# Patient Record
Sex: FEMALE | Race: WHITE
[De-identification: names, ages, dates, MRNs, and addresses within clinical notes are randomized per-mention and may not be internally consistent; named-entity substitution may affect disease eponyms.]

---

## 2017-05-09 ENCOUNTER — HOSPITAL ENCOUNTER (EMERGENCY)
Dept: HOSPITAL 53 - M ED | Age: 52
Discharge: HOME | End: 2017-05-09
Payer: OTHER GOVERNMENT

## 2017-05-09 VITALS — SYSTOLIC BLOOD PRESSURE: 156 MMHG | DIASTOLIC BLOOD PRESSURE: 87 MMHG

## 2017-05-09 VITALS — WEIGHT: 206 LBS | BODY MASS INDEX: 33.11 KG/M2 | HEIGHT: 66 IN

## 2017-05-09 DIAGNOSIS — G89.29: ICD-10-CM

## 2017-05-09 DIAGNOSIS — F17.200: ICD-10-CM

## 2017-05-09 DIAGNOSIS — M62.830: Primary | ICD-10-CM

## 2017-05-09 DIAGNOSIS — Z88.8: ICD-10-CM

## 2017-05-09 DIAGNOSIS — F32.9: ICD-10-CM

## 2017-05-09 DIAGNOSIS — Z91.040: ICD-10-CM

## 2017-05-09 DIAGNOSIS — Z98.1: ICD-10-CM

## 2017-05-09 DIAGNOSIS — Z90.49: ICD-10-CM

## 2017-05-09 DIAGNOSIS — F41.9: ICD-10-CM

## 2017-05-09 DIAGNOSIS — M62.81: ICD-10-CM

## 2017-05-09 PROCEDURE — 72148 MRI LUMBAR SPINE W/O DYE: CPT

## 2017-05-09 PROCEDURE — 96372 THER/PROPH/DIAG INJ SC/IM: CPT

## 2017-05-09 PROCEDURE — 99282 EMERGENCY DEPT VISIT SF MDM: CPT

## 2017-05-10 NOTE — REP
MRI lumbar spine without contrast:

 

History:  Low back pain, neurologic deficits right leg, question cauda equina

syndrome.  The patient reports a fall on the right side of the back with spasm

and incontinence.  Comparison MRI study is from December 18, 2012.

 

Technique:  Sagittal and axial T1 and T2-weighted scans are acquired in the usual

fashion with and without fat saturation.  Sequences include spin echo, turbo

spin-echo, and STIR imaging sequences.

 

MR findings:  The patient is status post transpedicular screw fixation on the

left side at the L4, L5 and S1 levels.  Posterior element fusion and laminectomy

at L4, L5 and S1.  Post laminectomy expansion of the thecal sac is seen dorsally

at these levels.  There is no evidence of central canal stenosis.  Conus

medullaris is normal in position and appearance at L1.  Lumbar vertebral body

heights are preserved.  Alignment is normal except for a minimal L4-5 grade 1 3

mm spondylolisthesis.  This was not apparent on December 18, 2012 prior MR study

but does not appear to be acute.  Inversion recovery images show no evidence of

ligament disruption or marrow edema.  No fracture or collapse is seen.  There is

no visible disc herniation at L5-S1, L4-5, or L3-4.  At L2-3, there is minimal

disc bulging diffusely.  No neural foraminal narrowing is seen.  Normal caliber

aorta.  No extraspinal abnormality.

 

Impression:

 

Status post laminectomy L4-S1 and unilateral transpedicular left-sided screw kami

fixation L4-S1.  No evidence of cauda equina compression.  No fracture or

collapse seen.

 

 

Signed by

Geo Perales MD 05/10/2017 08:07 A

## 2017-05-22 ENCOUNTER — HOSPITAL ENCOUNTER (EMERGENCY)
Dept: HOSPITAL 53 - M ED | Age: 52
Discharge: HOME | End: 2017-05-22
Payer: OTHER GOVERNMENT

## 2017-05-22 VITALS
BODY MASS INDEX: 31.66 KG/M2 | HEIGHT: 66 IN | WEIGHT: 197 LBS | DIASTOLIC BLOOD PRESSURE: 88 MMHG | SYSTOLIC BLOOD PRESSURE: 163 MMHG

## 2017-05-22 DIAGNOSIS — Z79.899: ICD-10-CM
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
## 2017-05-22 DIAGNOSIS — G89.29: ICD-10-CM

## 2017-05-22 DIAGNOSIS — S29.012A: Primary | ICD-10-CM
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
## 2017-05-22 DIAGNOSIS — M54.9: ICD-10-CM
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
## 2017-05-22 DIAGNOSIS — Z88.6: ICD-10-CM

## 2017-05-22 DIAGNOSIS — Z91.040: ICD-10-CM

## 2017-07-10 ENCOUNTER — HOSPITAL ENCOUNTER (EMERGENCY)
Dept: HOSPITAL 53 - M ED | Age: 52
Discharge: HOME | End: 2017-07-10
Payer: OTHER GOVERNMENT

## 2017-07-10 VITALS — DIASTOLIC BLOOD PRESSURE: 81 MMHG | SYSTOLIC BLOOD PRESSURE: 156 MMHG

## 2017-07-10 VITALS — HEIGHT: 66 IN | WEIGHT: 187.39 LBS | BODY MASS INDEX: 30.12 KG/M2

## 2017-07-10 DIAGNOSIS — Z79.899: ICD-10-CM
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
## 2017-07-10 DIAGNOSIS — Z88.6: ICD-10-CM

## 2017-07-10 DIAGNOSIS — Z91.040: ICD-10-CM

## 2017-07-10 DIAGNOSIS — K29.70: ICD-10-CM

## 2017-07-10 DIAGNOSIS — Z91.89: ICD-10-CM

## 2017-07-10 DIAGNOSIS — T78.40XA: Primary | ICD-10-CM

## 2017-07-10 DIAGNOSIS — G89.29: ICD-10-CM

## 2017-07-10 DIAGNOSIS — M54.9: ICD-10-CM
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
## 2017-07-10 DIAGNOSIS — F17.200: ICD-10-CM

## 2017-07-10 PROCEDURE — 93041 RHYTHM ECG TRACING: CPT

## 2017-07-10 PROCEDURE — 96374 THER/PROPH/DIAG INJ IV PUSH: CPT

## 2017-07-10 PROCEDURE — 99284 EMERGENCY DEPT VISIT MOD MDM: CPT

## 2017-07-10 PROCEDURE — 94640 AIRWAY INHALATION TREATMENT: CPT

## 2017-07-10 PROCEDURE — 96375 TX/PRO/DX INJ NEW DRUG ADDON: CPT

## 2017-07-10 PROCEDURE — 36415 COLL VENOUS BLD VENIPUNCTURE: CPT

## 2017-07-10 PROCEDURE — 94760 N-INVAS EAR/PLS OXIMETRY 1: CPT

## 2017-07-12 ENCOUNTER — HOSPITAL ENCOUNTER (OUTPATIENT)
Dept: HOSPITAL 53 - M PAIN | Age: 52
End: 2017-07-12
Attending: NURSE PRACTITIONER
Payer: OTHER GOVERNMENT

## 2017-07-12 DIAGNOSIS — Z88.8: ICD-10-CM

## 2017-07-12 DIAGNOSIS — R39.81: ICD-10-CM

## 2017-07-12 DIAGNOSIS — Z79.899: ICD-10-CM

## 2017-07-12 DIAGNOSIS — M54.6: ICD-10-CM

## 2017-07-12 DIAGNOSIS — F17.210: ICD-10-CM

## 2017-07-12 DIAGNOSIS — K21.9: ICD-10-CM

## 2017-07-12 DIAGNOSIS — Z88.6: ICD-10-CM

## 2017-07-12 DIAGNOSIS — Z91.040: ICD-10-CM

## 2017-07-12 DIAGNOSIS — G89.29: Primary | ICD-10-CM

## 2017-07-12 DIAGNOSIS — M53.3: ICD-10-CM

## 2017-08-02 ENCOUNTER — HOSPITAL ENCOUNTER (OUTPATIENT)
Dept: HOSPITAL 53 - M PAIN | Age: 52
End: 2017-08-02
Attending: NURSE PRACTITIONER
Payer: OTHER GOVERNMENT

## 2017-08-02 DIAGNOSIS — M54.6: ICD-10-CM

## 2017-08-02 DIAGNOSIS — M53.3: ICD-10-CM

## 2017-08-02 DIAGNOSIS — K21.9: ICD-10-CM

## 2017-08-02 DIAGNOSIS — F17.210: ICD-10-CM

## 2017-08-02 DIAGNOSIS — Z91.040: ICD-10-CM

## 2017-08-02 DIAGNOSIS — Z88.6: ICD-10-CM

## 2017-08-02 DIAGNOSIS — G89.29: Primary | ICD-10-CM

## 2017-08-02 DIAGNOSIS — Z79.899: ICD-10-CM

## 2017-08-02 DIAGNOSIS — L23.1: ICD-10-CM
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
## 2017-08-14 ENCOUNTER — HOSPITAL ENCOUNTER (EMERGENCY)
Dept: HOSPITAL 53 - M ED | Age: 52
LOS: 1 days | Discharge: HOME | End: 2017-08-15
Payer: COMMERCIAL

## 2017-08-14 VITALS — HEIGHT: 66 IN | WEIGHT: 190.39 LBS | BODY MASS INDEX: 30.6 KG/M2

## 2017-08-14 DIAGNOSIS — G89.29: ICD-10-CM
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
## 2017-08-14 DIAGNOSIS — M54.9: ICD-10-CM

## 2017-08-14 DIAGNOSIS — Z91.040: ICD-10-CM

## 2017-08-14 DIAGNOSIS — Z79.899: ICD-10-CM

## 2017-08-14 DIAGNOSIS — Z91.89: ICD-10-CM

## 2017-08-14 DIAGNOSIS — F32.9: ICD-10-CM

## 2017-08-14 DIAGNOSIS — K21.9: ICD-10-CM
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
## 2017-08-14 DIAGNOSIS — M47.892: ICD-10-CM
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
## 2017-08-14 DIAGNOSIS — Z88.6: ICD-10-CM

## 2017-08-14 DIAGNOSIS — S16.1XXA: Primary | ICD-10-CM

## 2017-08-14 DIAGNOSIS — F17.200: ICD-10-CM

## 2017-08-15 VITALS — SYSTOLIC BLOOD PRESSURE: 168 MMHG | DIASTOLIC BLOOD PRESSURE: 92 MMHG

## 2017-08-15 NOTE — REPUSA
CLINICAL HISTORY: Neck pain.

TECHNIQUE: Multiple axial images were obtained through the cervical spine. Images were also reconstru
cted in coronal and sagittal planes. The study was performed without IV contrast.

COMMENTS:

Unremarkable cervical fusion metallic hardware at C4, C5, C6 and C7 levels.

There is no fracture or spondylolisthesis visualized. The paraspinal soft tissues are unremarkable. T
here are no lytic or blastic lesions.

Straightening of cervical lordosis is seen, suggesting muscular spasm. There is evidence of minimal m
ultilevel disk disease, demonstrated by minimal osteophytosis and endplate sclerosis.

IMPRESSION:

1. No fracture or spondylolisthesis. Unremarkable metallic hardware.

2. Straightening of cervical lordosis is seen, suggesting muscular spasm.

3. Minimal multilevel spondylosis. 

Thank you for your kind referral of this patient.

     Electronically signed by AIDA CURTIS MD on 08/15/2017 01:10:26 AM ET
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
## 2017-08-17 ENCOUNTER — HOSPITAL ENCOUNTER (OUTPATIENT)
Dept: HOSPITAL 53 - M PAIN | Age: 52
End: 2017-08-17
Attending: ANESTHESIOLOGY
Payer: OTHER GOVERNMENT

## 2017-08-17 DIAGNOSIS — F17.210: ICD-10-CM

## 2017-08-17 DIAGNOSIS — M46.1: ICD-10-CM

## 2017-08-17 DIAGNOSIS — G89.29: Primary | ICD-10-CM

## 2017-08-17 DIAGNOSIS — M53.88: ICD-10-CM

## 2017-08-17 DIAGNOSIS — Z88.6: ICD-10-CM

## 2017-08-17 DIAGNOSIS — Z88.8: ICD-10-CM

## 2017-08-17 DIAGNOSIS — K21.9: ICD-10-CM

## 2017-08-17 DIAGNOSIS — Z79.899: ICD-10-CM

## 2017-08-17 DIAGNOSIS — Z91.040: ICD-10-CM

## 2017-08-17 NOTE — REP
PARTIAL SI JOINT SERIES:  Single view.

 

HISTORY:  Injection procedure for pain.

 

27 seconds of fluoroscopy time is reported.

 

FINDINGS:  A single fluoroscopically obtained intraprocedural spot radiograph

documents needle position and contrast injection associated with SI joint

injection procedure.  No laterality markers are seen.

 

 

Signed by

Geo Perales MD 08/17/2017 01:38 P
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
## 2017-09-05 ENCOUNTER — HOSPITAL ENCOUNTER (EMERGENCY)
Dept: HOSPITAL 53 - M ED | Age: 52
Discharge: HOME | End: 2017-09-05
Payer: OTHER GOVERNMENT

## 2017-09-05 VITALS — HEIGHT: 66 IN | BODY MASS INDEX: 29.76 KG/M2 | WEIGHT: 185.19 LBS

## 2017-09-05 VITALS — DIASTOLIC BLOOD PRESSURE: 88 MMHG | SYSTOLIC BLOOD PRESSURE: 152 MMHG

## 2017-09-05 DIAGNOSIS — Z91.018: ICD-10-CM

## 2017-09-05 DIAGNOSIS — K04.7: Primary | ICD-10-CM

## 2017-09-05 DIAGNOSIS — Z79.899: ICD-10-CM

## 2017-09-05 DIAGNOSIS — F17.200: ICD-10-CM

## 2017-09-05 DIAGNOSIS — Z91.040: ICD-10-CM

## 2017-09-05 DIAGNOSIS — Z88.6: ICD-10-CM

## 2017-09-06 ENCOUNTER — HOSPITAL ENCOUNTER (OUTPATIENT)
Dept: HOSPITAL 53 - M PAIN | Age: 52
End: 2017-09-06
Attending: NURSE PRACTITIONER
Payer: COMMERCIAL

## 2017-09-06 DIAGNOSIS — G89.29: ICD-10-CM

## 2017-09-06 DIAGNOSIS — M53.3: Primary | ICD-10-CM

## 2017-09-06 DIAGNOSIS — F17.210: ICD-10-CM

## 2017-09-06 DIAGNOSIS — F41.8: ICD-10-CM

## 2017-09-06 DIAGNOSIS — M79.1: ICD-10-CM

## 2017-09-06 DIAGNOSIS — K21.9: ICD-10-CM

## 2017-09-06 DIAGNOSIS — Z91.09: ICD-10-CM

## 2017-09-06 DIAGNOSIS — Z88.8: ICD-10-CM

## 2017-09-06 DIAGNOSIS — M96.1: ICD-10-CM

## 2017-09-06 DIAGNOSIS — Z91.040: ICD-10-CM

## 2017-09-06 DIAGNOSIS — Z79.899: ICD-10-CM

## 2017-09-06 DIAGNOSIS — Z79.891: ICD-10-CM

## 2017-09-08 ENCOUNTER — HOSPITAL ENCOUNTER (OUTPATIENT)
Dept: HOSPITAL 53 - M OPP | Age: 52
Discharge: HOME | End: 2017-09-08
Attending: INTERNAL MEDICINE
Payer: OTHER GOVERNMENT

## 2017-09-08 VITALS — HEIGHT: 66 IN | BODY MASS INDEX: 29.73 KG/M2 | WEIGHT: 185 LBS

## 2017-09-08 VITALS — SYSTOLIC BLOOD PRESSURE: 142 MMHG | DIASTOLIC BLOOD PRESSURE: 93 MMHG

## 2017-09-08 DIAGNOSIS — R06.83: ICD-10-CM

## 2017-09-08 DIAGNOSIS — G43.909: ICD-10-CM

## 2017-09-08 DIAGNOSIS — Z91.018: ICD-10-CM

## 2017-09-08 DIAGNOSIS — Z88.8: ICD-10-CM

## 2017-09-08 DIAGNOSIS — M54.2: ICD-10-CM

## 2017-09-08 DIAGNOSIS — D12.7: ICD-10-CM

## 2017-09-08 DIAGNOSIS — M19.90: ICD-10-CM

## 2017-09-08 DIAGNOSIS — K57.30: ICD-10-CM

## 2017-09-08 DIAGNOSIS — Z88.9: ICD-10-CM

## 2017-09-08 DIAGNOSIS — Z80.3: ICD-10-CM

## 2017-09-08 DIAGNOSIS — Z80.1: ICD-10-CM

## 2017-09-08 DIAGNOSIS — K64.8: ICD-10-CM

## 2017-09-08 DIAGNOSIS — Z91.040: ICD-10-CM

## 2017-09-08 DIAGNOSIS — Z79.899: ICD-10-CM

## 2017-09-08 DIAGNOSIS — F17.210: ICD-10-CM

## 2017-09-08 DIAGNOSIS — M54.89: ICD-10-CM

## 2017-09-08 DIAGNOSIS — Z12.11: Primary | ICD-10-CM

## 2017-09-08 DIAGNOSIS — Z98.1: ICD-10-CM

## 2017-09-08 DIAGNOSIS — R12: ICD-10-CM

## 2017-09-08 DIAGNOSIS — A63.0: ICD-10-CM

## 2017-09-08 DIAGNOSIS — F41.9: ICD-10-CM

## 2017-09-08 NOTE — ROOR
________________________________________________________________________________

Patient Name: Sara Mcpherson       Procedure Date: 9/8/2017 1:37 PM

MRN: [id number]                          Account Number: [id number]

YOB: 1965              Age: 51

Room: [facility]                            Gender: Female

Note Status: Finalized                 

________________________________________________________________________________

 

Procedure:           Colonoscopy

Indications:         Screening for colorectal malignant neoplasm, Incidental - 

                     Fecal incontinence

Providers:           Chepe Flores MD

Referring MD:        Sherice Grissom NP

Requesting Provider: 

Medicines:           Monitored Anesthesia Care

Complications:       No immediate complications.

________________________________________________________________________________

Procedure:           Pre-Anesthesia Assessment:

                     - Prior to the procedure, a History and Physical was 

                     performed, and patient medications and allergies were 

                     reviewed. The patient is competent. The risks and 

                     benefits of the procedure and the sedation options and 

                     risks were discussed with the patient. All questions were 

                     answered and informed consent was obtained. Patient 

                     identification and proposed procedure were verified by 

                     the physician, the nurse and the anesthetist in the 

                     procedure room. Mental Status Examination: alert and 

                     oriented. Airway Examination: normal oropharyngeal airway 

                     and neck mobility. Respiratory Examination: clear to 

                     auscultation. CV Examination: normal. Prophylactic 

                     Antibiotics: The patient does not require prophylactic 

                     antibiotics. Prior Anticoagulants: The patient has taken 

                     no previous anticoagulant or antiplatelet agents. ASA 

                     Grade Assessment: II - A patient with mild systemic 

                     disease. After reviewing the risks and benefits, the 

                     patient was deemed in satisfactory condition to undergo 

                     the procedure. The anesthesia plan was to use monitored 

                     anesthesia care (MAC). Immediately prior to 

                     administration of medications, the patient was 

                     re-assessed for adequacy to receive sedatives. The heart 

                     rate, respiratory rate, oxygen saturations, blood 

                     pressure, adequacy of pulmonary ventilation, and response 

                     to care were monitored throughout the procedure. The 

                     physical status of the patient was re-assessed after the 

                     procedure.

                     The Colonoscope was introduced through the anus and 

                     advanced to the terminal ileum, with identification of 

                     the appendiceal orifice and IC valve. The colonoscopy was 

                     performed without difficulty. The patient tolerated the 

                     procedure well. The quality of the bowel preparation was 

                     adequate to identify polyps 6 mm and larger in size and 

                     fair. The terminal ileum, ileocecal valve, appendiceal 

                     orifice, and rectum were photographed. Scope insertion 

                     time was 3 minutes. Scope withdrawal time was 8 minutes. 

                     The total duration of the procedure was 13 minutes.

                                                                                

Findings:

     The perianal exam findings include perianal condylomata.

     The terminal ileum appeared normal.

     A few sessile polyps were found in the recto-sigmoid colon. The polyps 

     were 3 to 5 mm in size. These polyps were removed with a cold biopsy 

     forceps. Resection and retrieval were complete. Verification of patient 

     identification for the specimen was done by the physician and nurse using 

     the patient's name, birth date and medical record number. Estimated blood 

     loss was minimal.

     Multiple small and large-mouthed diverticula were found from sigmoid to 

     descending colon. There was no evidence of diverticular bleeding.

     Non-bleeding external hemorrhoids were found during retroflexion. The 

     hemorrhoids were small.

                                                                                

Impression:          - Preparation of the colon was fair.

                     - Perianal condylomata found on perianal exam.

                     - The examined portion of the ileum was normal.

                     - A few 3 to 5 mm polyps at the recto-sigmoid colon, 

                     removed with a cold biopsy forceps. Resected and 

                     retrieved.

                     - Moderate diverticulosis from sigmoid to descending 

                     colon. There was no evidence of diverticular bleeding.

                     - Non-bleeding external hemorrhoids.

Recommendation:      - Patient has a contact number available for emergencies. 

                     The signs and symptoms of potential delayed complications 

                     were discussed with the patient. Return to normal 

                     activities tomorrow. Written discharge instructions were 

                     provided to the patient.

                     - Continue present medications.

                     - High fiber diet.

                     - Await pathology results.

                     - Repeat colonoscopy in 3 years because the bowel 

                     preparation was suboptimal and for surveillance based on 

                     pathology results.

                     - Return to GI clinic in 3 years.

                     - Fecal incontinence vould be related to pelvic floor 

                     muscle weakness -- needs pelvic floor muscl exercises.

                     - Return to primary care physician.

                                                                                

 

Chepe Flores MD

_______________________

Chepe Flores MD

9/8/2017 2:14:26 PM

This report has been signed electronically.

Number of Addenda: 0

 

Note Initiated On: 9/8/2017 1:37 PM

Estimated Blood Loss:

     Estimated blood loss was minimal.
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
## 2017-09-26 ENCOUNTER — HOSPITAL ENCOUNTER (OUTPATIENT)
Dept: HOSPITAL 53 - M PAIN | Age: 52
End: 2017-09-26
Attending: ANESTHESIOLOGY
Payer: COMMERCIAL

## 2017-09-26 DIAGNOSIS — L23.5: ICD-10-CM

## 2017-09-26 DIAGNOSIS — Z79.891: ICD-10-CM

## 2017-09-26 DIAGNOSIS — G89.29: Primary | ICD-10-CM

## 2017-09-26 DIAGNOSIS — K21.9: ICD-10-CM

## 2017-09-26 DIAGNOSIS — Z79.899: ICD-10-CM

## 2017-09-26 DIAGNOSIS — Z91.040: ICD-10-CM

## 2017-09-26 DIAGNOSIS — M25.511: ICD-10-CM

## 2017-09-26 DIAGNOSIS — M54.2: ICD-10-CM

## 2017-09-26 DIAGNOSIS — Z88.6: ICD-10-CM

## 2017-09-26 DIAGNOSIS — M79.1: ICD-10-CM

## 2017-09-26 DIAGNOSIS — M54.5: ICD-10-CM

## 2017-09-26 DIAGNOSIS — F41.8: ICD-10-CM

## 2017-09-26 PROCEDURE — 20553 NJX 1/MLT TRIGGER POINTS 3/>: CPT

## 2017-09-27 NOTE — ECWPNPC
PATIENT NAME: CARMEN DEAN

: 1965

GENDER: FEMALE

MRN: [id number]

VISIT DATE: 2017

DISCHARGE DATE: 178

VISIT LOCKED DATE TIME: 

PHYSICIAN: MICHAEL GARVEY  

RESOURCE: MICHAEL GARVEY  

 

           

           

REASON FOR APPOINTMENT

           

          1. TPI, LOW BACK

           

HISTORY OF PRESENT ILLNESS

           

      HISTORY OF PRESENT ILLNESS:

      PAIN

           

           

          THE PATIENT DESCRIBES THE PAIN...

           

      FALL RISK SCREENING:

      SCREENING

           

           

          :NO FALLS IN THE PAST YEAR

           

CURRENT MEDICATIONS

           

          TAKING FLONASE ALLERGY RELIEF 50 MCG/ACT SUSPENSION 1 SPRAY IN

          EACH NOSTRIL NASALLY ONCE A DAY, NOTES: 17

          TAKING OMEPRAZOLE 40 MG CAPSULE DELAYED RELEASE 1 CAPSULE ORALLY

          ONCE A DAY, NOTES: 17

          TAKING PAXIL 30 MG TABLET 1 TABLET IN THE MORNING ORALLY ONCE A

          DAY, NOTES: 17 0830

          TAKING TRAZODONE  MG TABLET 1 TABLET AT BEDTIME ORALLY

          ONCE A DAY, NOTES: 17

          TAKING SOMA 350 MG TABLET 1 TABLET AS NEEDED ORALLY Q8H PRN MDD3

          30 TAB=30DAY SUPPLY, NOTES: 17

          TAKING NORCO 5-325 MG TABLET 1 TABLET AS NEEDED ORALLY Q6H PRN

          MDD4 60 TAB FOR 30 DAY SUPPLY, NOTES: 17

          TAKING TRAZODONE  MG TABLET TAKE ONE TABLET BY MOUTH AT

          BEDTIME , NOTES: 17

          MEDICATION LIST REVIEWED AND RECONCILED WITH THE PATIENT

           

PAST MEDICAL HISTORY

           

          GERD

          OSTEOARTHRITIS CERVICAL SPINE

           

ALLERGIES

           

          LATEX (FOR ALLERGY USE ONLY): ANAPHYLAXIS: ALLERGY

          NSAIDS: GI SYMPTOMS: SIDE EFFECTS

          DERMA BOND: "EATS MY SKIN": ALLERGY

           

REVIEW OF SYSTEMS

           

      REVIEWED BY:

           

          PROVIDER:    _____ .

           

      CONSTITUTIONAL:

           

          ANY CHANGE IN YOUR MEDICAL CONDITION?    NO . CHILLS    NO .

          FEVER    NO .

           

      INFECTION:

           

          DO YOU HAVE NEW INFECTIONS?    NO . DO YOU HAVE HISTORY OF MRSA? 

            NO .

           

      MUSCULOSKELETAL:

           

          ANY NEW PATTERNS OF PAIN OR NUMBNESS?    NO .

           

      GASTROENTEROLOGY:

           

          ANY NEW CHANGE IN BOWEL CONTROL?    YES, PT STATES SHE HAS HAD

          BOWEL INCONTENENCE X2 IN PAST 2 DAYS .

           

      GENITOURINARY:

           

          ANY NEW CHANGE IN BLADDER CONTROL?    NO . IS THERE A CHANCE YOU

          COULD BE PREGNANT?    NO .

           

      HEMATOLOGY/LYMPH:

           

          DO YOU TAKE ANY BLOOD THINNERS? (FOR EXAMPLE- COUMADIN, PLAVIX,

          AGGRENOX, PLATEL, PRADAXA, OR XARELTO)    NO . WHEN WAS YOUR LAST

          DOSE?    DATE: TIME:  .

           

      NEUROLOGY:

           

          HAVE YOU FALLEN IN THE PAST 6 MONTHS?    NO . ANY NEW EXTREMITY

          NUMBNESS OR WEAKNESS?    NO .

           

      CARDIOLOGY:

           

          DO YOU HAVE A PACEMAKER OR DEFIBRILLATOR?    NO .

           

      RESPIRATORY:

           

          HAVE YOU BEEN SICK IN THE PAST WEEK?    NO . FEVER    NO . FLU

          LIKE SYMPTOMS?    NO . COUGH    NO .

           

      INTEGUMENTARY:

           

          DO YOU HAVE ANY RASHES OR OPEN SORES?    NO .

           

      ALLERGIC/IMMUNO:

           

          ARE YOU ALLERGIC TO SHELLFISH OR IV DYE?    NO . ANY NEW

          ALLERGIES?    NO .

           

      PSYCHIATRIC:

           

          DO YOU HAVE THOUGHTS OF HURTING YOURSELF OR SOMEONE ELSE?    NO .

          ARE YOU ABUSED, NEGLECTED, OR IN AN UNSAFE ENVIRONMENT?    NO .

           

      ENDOCRINOLOGY:

           

          ARE YOU DIABETIC?    NO .

           

      OTHER:

           

          DO YOU NEED ANY PRESCRIPTIONS?    YES, PERCOSET, SOMA . IF YES,

          PLEASE LIST:    ____ . ANY NEW PROBLEMS WITH YOUR MEDICATIONS?   

          NO . WHEN DID YOU LAST EAT?    17 0900 . WHEN DID YOU LAST

          DRINK?    17 1100 . WHAT DID YOU LAST DRINK?    WATER . NAME

          OF PERSON DRIVING YOU HOME?    TONYA . DO YOU HAVE ANY OTHER

          QUESTIONS OR CONCERNS    NO .

           

VITAL SIGNS

           

           LBS, HT 66 IN, BMI 29.05 INDEX, /66 MM HG, HR 63

          /MIN, RR 18 /MIN, TEMP 98.5 F, OXYGEN SAT % 98%, NA INITIALS AW

          1507.

           

ASSESSMENTS

           

          MYALGIA - M79.1 (PRIMARY)

           

PROCEDURES

           

      PN TRIGGER POINT INJECTION WITH STEROIDS

          PRE PROCEDURE DIAGNOSIS    1. MYALGIA 2. PAIN AT RIGHT NECK AREA,

          RIGHT SHOULDER AREA, AND RIGHT LOWER BACK AREA

          POST PROCEDURE DIAGNOSIS    1. MYALGIA 2. PAIN AT RIGHT NECK

          AREA, RIGHT SHOULDER AREA, AND RIGHT LOWER BACK AREA

          PROCEDURE    TRIGGER POINT INJECTION AT RIGHT NECK AREA, RIGHT

          SHOULDER AREA, AND RIGHT LOWER BACK AREA

          SURGEON    DR. MICHAEL GARVEY

          ASSISTANT    NONE

          ANESTHESIA    LOCAL

          PRE PROCEDURE NOTE    THE PATIENT HAS A HISTORY OF CHRONIC PAIN

          AT THE RIGHT NECK AREA, RIGHT SHOULDER AREA, AND RIGHT LOWER BACK

          AREA. I EVALUATE THE PATIENT AND REVIEWED THE CHART. THERE IS

          EVIDENCE OF BANDS OF TISSUE WITH RESTRICTION OF MOVEMENT AND

          PRESENCE OF TRIGGER POINT AT THE AFFECTED AREA. I WENT OVER THE

          RISKS, ALTERNATIVES, AND BENEFITS ASSOCIATED WITH THIS PROCEDURE.

          THE PATIENT WOULD LIKE TO PROCEED AND GIVE CONSENT TO PERFORMED

          THE PROCEDURE. THE PATIENT DENIES UNEXPLAINABLE WEIGHT LOSS,

          FEVER, CHILLS, OR NEW CHANGES IN URINARY OR BOWEL CONTROL

          DESCRIPTION OF PROCEDURE    THE PATIENT WAS BROUGHT TO THE

          PROCEDURE ROOM AND PLACED IN THE SITTING POSITION. THE AREA WAS

          CLEANED WITH ALCOHOL. THE PROCEDURE WAS DONE USING ASEPTIC

          STERILE TECHNIQUE. I CHECKED LATERALITY AND THE LEVEL WHERE THE

          PROCEDURE WAS GOING TO BE PERFORMED WITH THE PATIENT AND THE

          SUPPORTING STAFF AT THE MOMENT OF THE TIME OUT IN THE PROCEDURE

          ROOM. USING A 25-GAUGE NEEDLE, TRIGGER POINTS WERE INJECTED AT

          THE RIGHT NECK AREA, RIGHT SHOULDER AREA, AND RIGHT LOWER BACK

          AREA WITH A TOTAL OF 40 ML OF BUPIVACAINE 0.25% AND KENALOG 40

          MG. THERE WAS NO EVIDENCE OF BLOOD, PARESTHESIA OR CEREBROSPINAL

          FLUID DURING THE PROCEDURE. THE PATIENT WAS SENT TO THE RECOVERY

          ROOM. THE PATIENT WAS MOVING THE EXTREMITIES AND DOING WELL.

          THERE WAS NO COMPLICATION DURING THE PROCEDURE

          POST PROCEDURE NOTE    THE PATIENT WILL BE SEEN IN A FOLLOW UP IN

          THE NEXT FEW WEEKS. INSTRUCTIONS WERE GIVEN, QUESTIONS WERE

          ANSWERED, AND THE PATIENT EXPRESSED UNDERSTANDING AND AGREES WITH

          THE PLAN. I, LAURIE FOSTER, DOCUMENTED THE ABOVE INFORMATION

          ACTING AS A SCRIBE FOR DR. GARVEY. I HAVE REVIEWED THE ABOVE

          DOCUMENT, WRITTEN BY LAURIE LOZADA AND I VERIFY THAT IT

          IS ACCURATE

           

PROCEDURE CODES

           

          75181 INJECT TRIGGER POINTS 3/>

           

DISPOSITION & COMMUNICATION

           

FOLLOW UP

           

          3 WEEKS

           

 

ELECTRONICALLY SIGNED BY MICHAEL GARVEY MD ON

          2017 AT 09:46 PM EDT

           

           

           

 

DISCLAIMER :

THIS IS A VISIT SUMMARY EXTRACTED FROM THE [employer] CHART.

IT IS NOT A COPY OF THE [employer] PROGRESS NOTE.

MTDOMKAR

## 2017-10-10 ENCOUNTER — HOSPITAL ENCOUNTER (OUTPATIENT)
Dept: HOSPITAL 53 - M PAIN | Age: 52
End: 2017-10-10
Attending: NURSE PRACTITIONER
Payer: COMMERCIAL

## 2017-10-10 DIAGNOSIS — K21.9: ICD-10-CM

## 2017-10-10 DIAGNOSIS — M79.1: ICD-10-CM

## 2017-10-10 DIAGNOSIS — G89.29: Primary | ICD-10-CM

## 2017-10-10 DIAGNOSIS — F17.210: ICD-10-CM

## 2017-10-10 DIAGNOSIS — Z91.040: ICD-10-CM

## 2017-10-10 DIAGNOSIS — Z79.899: ICD-10-CM

## 2017-10-10 DIAGNOSIS — L23.89: ICD-10-CM

## 2017-10-10 DIAGNOSIS — M54.12: ICD-10-CM

## 2017-10-10 DIAGNOSIS — Z88.6: ICD-10-CM
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
## 2017-11-07 ENCOUNTER — HOSPITAL ENCOUNTER (OUTPATIENT)
Dept: HOSPITAL 53 - M PAIN | Age: 52
End: 2017-11-07
Attending: ANESTHESIOLOGY
Payer: COMMERCIAL

## 2017-11-07 DIAGNOSIS — Z88.6: ICD-10-CM

## 2017-11-07 DIAGNOSIS — F41.9: ICD-10-CM

## 2017-11-07 DIAGNOSIS — Z79.899: ICD-10-CM

## 2017-11-07 DIAGNOSIS — Z91.040: ICD-10-CM

## 2017-11-07 DIAGNOSIS — G89.29: Primary | ICD-10-CM

## 2017-11-07 DIAGNOSIS — L23.1: ICD-10-CM

## 2017-11-07 DIAGNOSIS — M47.812: ICD-10-CM

## 2017-11-07 PROCEDURE — 99153 MOD SED SAME PHYS/QHP EA: CPT

## 2017-11-07 PROCEDURE — 64490 INJ PARAVERT F JNT C/T 1 LEV: CPT

## 2017-11-07 PROCEDURE — 99152 MOD SED SAME PHYS/QHP 5/>YRS: CPT

## 2017-11-07 PROCEDURE — 64491 INJ PARAVERT F JNT C/T 2 LEV: CPT

## 2017-11-07 NOTE — REP
Partial cervical spine series:  Single view.

 

History:  Bilateral facet block for pain.

 

15 seconds of fluoroscopy time is reported.

 

Findings:  A single last image hold fluoroscopic spot radiograph of the cervical

spine documents various needle positions and contrast injections associated with

facet injection procedure.

 

 

Signed by

Geo Perales MD 11/07/2017 05:18 P

## 2017-11-14 NOTE — ECWPNPC
PATIENT NAME: CARMEN DEAN

: 1965

GENDER: FEMALE

MRN: [id number]

VISIT DATE: 2017

DISCHARGE DATE: 17 1359

VISIT LOCKED DATE TIME: 

PHYSICIAN: MICHAEL GARVEY  

RESOURCE: MICHAEL GARVEY  

 

           

           

REASON FOR APPOINTMENT

           

          1. C4/5-C5/6 BILAT THERAPEUTIC FACET-NO FAULT

           

HISTORY OF PRESENT ILLNESS

           

      HISTORY OF PRESENT ILLNESS:

      PAIN

           

           

          THE PATIENT DESCRIBES THE PAIN...

           

      FALL RISK SCREENING:

      SCREENING

           

           

          :NO FALLS IN THE PAST YEAR

           

CURRENT MEDICATIONS

           

          TAKING FLONASE ALLERGY RELIEF 50 MCG/ACT SUSPENSION 1 SPRAY IN

          EACH NOSTRIL NASALLY ONCE A DAY, NOTES: NOT LATELY

          TAKING OMEPRAZOLE 40 MG CAPSULE DELAYED RELEASE 1 CAPSULE ORALLY

          ONCE A DAY, NOTES: 17 0700

          TAKING PAXIL 30 MG TABLET 1 TABLET IN THE MORNING ORALLY ONCE A

          DAY, NOTES: 17 0700

          TAKING TRAZODONE  MG TABLET 1 TABLET AT BEDTIME ORALLY

          ONCE A DAY, NOTES: 17 2100

          TAKING NORCO  MG TABLET 1 TABLET AS NEEDED ORALLY Q8HPRN

          MDD3 #45=30 DAY SUPPLY, NOTES: NOT LATELY

          TAKING SOMA 350 MG TABLET 1 TABLET AS NEEDED ORALLY Q8H PRN MDD3

          30 TAB=30DAY SUPPLY, NOTES: NOT LATELY

          UNKNOWN TRAZODONE  MG TABLET TAKE ONE TABLET BY MOUTH AT

          BEDTIME , NOTES: 17

          MEDICATION LIST REVIEWED AND RECONCILED WITH THE PATIENT

           

PAST MEDICAL HISTORY

           

          GERD

          OSTEOARTHRITIS CERVICAL SPINE

           

ALLERGIES

           

          LATEX (FOR ALLERGY USE ONLY): ANAPHYLAXIS: ALLERGY

          NSAIDS: GI SYMPTOMS: SIDE EFFECTS

          DERMA BOND: "EATS MY SKIN": ALLERGY

           

SURGICAL HISTORY

           

          T & A 1971

          HYSTERECTOMY, TOTAL WITH BSO 

          CARPAL TUNNEL RELEASE BILATERAL

          CERVICAL FUSION X'S 2 , 2015

          LUMBAR FUSION 2015

          CHOLECYSTECTOMY 2016

           

HOSPITALIZATION/MAJOR DIAGNOSTIC PROCEDURE

           

          AS ABOVE

           

REVIEW OF SYSTEMS

           

      REVIEWED BY:

           

          PROVIDER:    _____ .

           

      CONSTITUTIONAL:

           

          ANY CHANGE IN YOUR MEDICAL CONDITION?    NO . CHILLS    NO .

          FEVER    NO .

           

      INFECTION:

           

          DO YOU HAVE NEW INFECTIONS?    NO . DO YOU HAVE HISTORY OF MRSA? 

            NO .

           

      MUSCULOSKELETAL:

           

          ANY NEW PATTERNS OF PAIN OR NUMBNESS?    NO .

           

      GASTROENTEROLOGY:

           

          ANY NEW CHANGE IN BOWEL CONTROL?    NO .

           

      GENITOURINARY:

           

          ANY NEW CHANGE IN BLADDER CONTROL?    NO . IS THERE A CHANCE YOU

          COULD BE PREGNANT?    NO .

           

      HEMATOLOGY/LYMPH:

           

          DO YOU TAKE ANY BLOOD THINNERS? (FOR EXAMPLE- COUMADIN, PLAVIX,

          AGGRENOX, PLATEL, PRADAXA, OR XARELTO)    NO . WHEN WAS YOUR LAST

          DOSE?    DATE: TIME:  .

           

      NEUROLOGY:

           

          HAVE YOU FALLEN IN THE PAST 6 MONTHS?    NO . ANY NEW EXTREMITY

          NUMBNESS OR WEAKNESS?    NO .

           

      CARDIOLOGY:

           

          DO YOU HAVE A PACEMAKER OR DEFIBRILLATOR?    NO .

           

      RESPIRATORY:

           

          HAVE YOU BEEN SICK IN THE PAST WEEK?    NO . FEVER    NO . FLU

          LIKE SYMPTOMS?    NO . COUGH    NO .

           

      INTEGUMENTARY:

           

          DO YOU HAVE ANY RASHES OR OPEN SORES?    NO .

           

      ALLERGIC/IMMUNO:

           

          ARE YOU ALLERGIC TO SHELLFISH OR IV DYE?    NO . ANY NEW

          ALLERGIES?    NO .

           

      PSYCHIATRIC:

           

          DO YOU HAVE THOUGHTS OF HURTING YOURSELF OR SOMEONE ELSE?    NO .

          ARE YOU ABUSED, NEGLECTED, OR IN AN UNSAFE ENVIRONMENT?    NO .

           

      ENDOCRINOLOGY:

           

          ARE YOU DIABETIC?    NO .

           

      OTHER:

           

          DO YOU NEED ANY PRESCRIPTIONS?    NO . IF YES, PLEASE LIST:   

          ____ . ANY NEW PROBLEMS WITH YOUR MEDICATIONS?    NO . WHEN DID

          YOU LAST EAT?    17 8PM . WHEN DID YOU LAST DRINK?   

          17 8AM . WHAT DID YOU LAST DRINK?    WATER . NAME OF PERSON

          DRIVING YOU HOME?    MAURO HUSSEIN . DO YOU HAVE ANY OTHER QUESTIONS

          OR CONCERNS    NO .

           

VITAL SIGNS

           

           LBS, HT 66 IN, BMI 29.70 INDEX, /95 MM HG, HR 56

          /MIN, RR 16 /MIN, TEMP 98.2 F, OXYGEN SAT % 98%, NA INITIALS SC

          10:54, REVIEWED BY: CM.

           

ASSESSMENTS

           

          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

          RADICULOPATHY - M47.812 (PRIMARY)

           

PROCEDURES

           

      PN CERVICAL FACET BLOCK LOW BILATERAL CERVICAL

          PRE PROCEDURE DIAGNOSIS    CERVICAL SPONDYLOSIS

          POST PROCEDURE DIAGNOSIS    CERVICAL SPONDYLOSIS

          PROCEDURE    BILATERAL C4-C5 AND BILATERAL C5-C6 CERVICAL FACET

          BLOCK THERAPEUTIC

          SURGEON    DR. MICHAEL GARVEY

          ASSISTANT    NONE

          ANESTHESIA    LOCAL WITH IV SEDATION

          PRE PROCEDURE NOTE    THE PATIENT HAS HISTORY OF CHRONIC CERVICAL

          PAIN. I EVALUATE THE PATIENT AND REVIEWED THE CHART. I WENT OVER

          THE RISKS, ALTERNATIVES, AND BENEFITS ASSOCIATED WITH THIS

          PROCEDURE. PATIENT WOULD LIKE TO MOVE FORWARD WITH IV SEDATION

          DUE TO DISCOMFORT, PAIN AND ANXIETY ASSOCIATED WITH THE

          PROCEDURE. THE PATIENT WOULD LIKE TO PROCEED AND GIVE CONSENT TO

          PERFORMED THE PROCEDURE. THE PATIENT DENIES UNEXPLAINABLE WEIGHT

          LOSS, FEVER, CHILLS, OR NEW CHANGES IN URINARY OR BOWEL CONTROL.

          DESCRIPTION OF PROCEDURE    THE PATIENT WAS BROUGHT TO THE

          PROCEDURE ROOM AND PLACED IN THE PRONE POSITION. THE

          CERVICOTHORACIC AREA WAS CLEANED WITH CHLORAPREP SOLUTION AND

          DRAPED ASEPTICALLY. THE PROCEDURE WAS DONE UNDER STERILE

          CONDITIONS. I CHECKED LATERALITY AND THE LEVEL WHERE THE

          PROCEDURE WAS GOING TO BE PERFORMED WITH THE PATIENT AND THE

          SUPPORTING STAFF AT THE MOMENT OF THE TIME OUT IN THE PROCEDURE

          ROOM. UNDER FLUOROSCOPIC GUIDANCE, TARGET POINT WAS SELECTED AT

          THE RIGHT AND LEFT C4-C5 AND RIGHT AND LEFT C5-C6 CERVICAL FACET

          JOINT. TARGET POINTS WERE SELECTED AFTER LATERAL ROTATION AND

          TILT OF THE MAGNIFIER OF THE C-ARM. LIDOCAINE 0.5% WAS USED TO

          NUMB THE SKIN AND THE SUBCUTANEOUS TISSUE BELOW IT. SPINAL

          NEEDLES, 22-GAUGE, WERE ADVANCED UNDER FLUOROSCOPIC GUIDANCE AND

          FOLLOWING PATIENT FEEDBACK UNTIL THE TARGETS WERE TOUCHED. THE

          POSITION OF THE NEEDLES WAS VERIFIED WITH AP AND LATERAL VIEWS.

          AFTER PROPER POSITION OF THE NEEDLES WAS ACHIEVED, ISOVUE M DYE

          30, 0.1 ML WAS INJECTED SHOWING SPREAD OF THE DYE. THEN A

          SOLUTION OF 0.9 ML OF BUPIVACAINE 0.125% AND KENALOG 10 MG WAS

          INJECTED AT EACH SITE. PATIENT RECEIVED VERSED 2 MG AND FENTANYL

          300 MCG IV DIVIDED DOSES THERE WAS NO EVIDENCE OF BLOOD,

          PARESTHESIA OR CEREBROSPINAL FLUID DURING THE PROCEDURE. THE

          PATIENT WAS SENT TO THE RECOVERY ROOM. THE PATIENT WAS MOVING THE

          EXTREMITIES AND DOING WELL. THERE WAS NO COMPLICATION DURING THE

          PROCEDURE. FLUOROSCOPY TIME WAS 15 SECONDS. FACE TO FACE TIME WAS

          28 MINUTES

          POST PROCEDURE NOTE    THE PATIENT WILL BE SEEN IN A FOLLOW UP IN

          THE NEXT FEW WEEKS. INSTRUCTIONS WERE GIVEN, QUESTIONS WERE

          ANSWERED, AND THE PATIENT EXPRESSED UNDERSTANDING AND AGREES WITH

          THE PLAN. I, LAURIE FOSTER, DOCUMENTED THE ABOVE INFORMATION

          ACTING AS A SCRIBE FOR DR. GARVEY. I HAVE REVIEWED THE ABOVE

          DOCUMENT, WRITTEN BY LAURIE FOSTER SCRIBLUDIN AND I VERIFY THAT IT

          IS ACCURATE

           

DIAGNOSTIC IMAGING

           

          [facility] FACET BLOCK (PAIN)[id number]

           

PROCEDURE CODES

           

          68879 INJ PARAVERT F JNT C/T 1 LEV, MODIFIERS: 50

           

          55761 INJ PARAVERT F JNT C/T 2 LEV, MODIFIERS: 50

           

          6045F RADXPS IN END [id number] PXD

           

          67544 MOD SED SAME PHYS/QHP 5/>YRS

           

          07853 MOD SED SAME PHYS/QHP EA

           

DISPOSITION & COMMUNICATION

           

FOLLOW UP

           

          3 WEEKS

           

 

ELECTRONICALLY SIGNED BY MICHAEL GARVEY MD ON

          2017 AT 12:20 PM EST

           

           

           

 

DISCLAIMER :

THIS IS A VISIT SUMMARY EXTRACTED FROM THE [email or web address] CHART.

IT IS NOT A COPY OF THE [email or web address] PROGRESS NOTE.

JEVOANNY

## 2017-12-19 ENCOUNTER — HOSPITAL ENCOUNTER (OUTPATIENT)
Dept: HOSPITAL 53 - M PAIN | Age: 52
End: 2017-12-19
Attending: NURSE PRACTITIONER
Payer: COMMERCIAL

## 2017-12-19 DIAGNOSIS — G89.29: ICD-10-CM

## 2017-12-19 DIAGNOSIS — M96.1: Primary | ICD-10-CM

## 2017-12-19 DIAGNOSIS — M47.812: ICD-10-CM

## 2017-12-19 DIAGNOSIS — Z79.899: ICD-10-CM

## 2017-12-19 DIAGNOSIS — Z79.891: ICD-10-CM

## 2017-12-19 DIAGNOSIS — M53.3: ICD-10-CM

## 2017-12-19 DIAGNOSIS — Z88.6: ICD-10-CM

## 2017-12-19 DIAGNOSIS — M79.1: ICD-10-CM

## 2017-12-19 DIAGNOSIS — Z91.030: ICD-10-CM

## 2017-12-19 DIAGNOSIS — L23.1: ICD-10-CM

## 2017-12-19 DIAGNOSIS — Z87.891: ICD-10-CM
